# Patient Record
Sex: FEMALE | Race: WHITE | NOT HISPANIC OR LATINO | URBAN - METROPOLITAN AREA
[De-identification: names, ages, dates, MRNs, and addresses within clinical notes are randomized per-mention and may not be internally consistent; named-entity substitution may affect disease eponyms.]

---

## 2017-02-17 ENCOUNTER — OUTPATIENT (OUTPATIENT)
Dept: OUTPATIENT SERVICES | Facility: HOSPITAL | Age: 67
LOS: 1 days | Discharge: HOME | End: 2017-02-17

## 2017-02-17 ENCOUNTER — APPOINTMENT (OUTPATIENT)
Dept: HEMATOLOGY ONCOLOGY | Facility: CLINIC | Age: 67
End: 2017-02-17

## 2017-02-17 VITALS
BODY MASS INDEX: 35.33 KG/M2 | HEIGHT: 62 IN | SYSTOLIC BLOOD PRESSURE: 134 MMHG | WEIGHT: 192 LBS | RESPIRATION RATE: 14 BRPM | DIASTOLIC BLOOD PRESSURE: 80 MMHG | TEMPERATURE: 96.9 F | HEART RATE: 65 BPM

## 2017-02-17 DIAGNOSIS — Z86.79 PERSONAL HISTORY OF OTHER DISEASES OF THE CIRCULATORY SYSTEM: ICD-10-CM

## 2017-02-17 DIAGNOSIS — Z87.09 PERSONAL HISTORY OF OTHER DISEASES OF THE RESPIRATORY SYSTEM: ICD-10-CM

## 2017-02-17 DIAGNOSIS — E78.00 PURE HYPERCHOLESTEROLEMIA, UNSPECIFIED: ICD-10-CM

## 2017-02-17 DIAGNOSIS — Z78.9 OTHER SPECIFIED HEALTH STATUS: ICD-10-CM

## 2017-02-17 LAB
BASOPHILS # BLD: 0.04 TH/MM3
BASOPHILS NFR BLD: 0.5 %
EOSINOPHIL # BLD: 0.27 TH/MM3
EOSINOPHIL NFR BLD: 3.7 %
ERYTHROCYTE [DISTWIDTH] IN BLOOD BY AUTOMATED COUNT: 13.6 %
GRANULOCYTES # BLD: 3.77 TH/MM3
GRANULOCYTES NFR BLD: 51.4 %
HCT VFR BLD AUTO: 41.5 %
HGB BLD-MCNC: 13.6 G/DL
IMM GRANULOCYTES # BLD: 0.01 TH/MM3
IMM GRANULOCYTES NFR BLD: 0.1 %
LYMPHOCYTES # BLD: 2.48 TH/MM3
LYMPHOCYTES NFR BLD: 33.7 %
MCH RBC QN AUTO: 31.6 PG
MCHC RBC AUTO-ENTMCNC: 32.8 G/DL
MCV RBC AUTO: 96.5 FL
MONOCYTES # BLD: 0.78 TH/MM3
MONOCYTES NFR BLD: 10.6 %
PLATELET # BLD: 269 TH/MM3
PMV BLD AUTO: 9.9 FL
RBC # BLD AUTO: 4.3 MIL/MM3
WBC # BLD: 7.35 TH/MM3

## 2017-02-21 LAB
ALBUMIN SERPL-MCNC: 3.8 G/DL
ALBUMIN/GLOB SERPL: 1.36
ALP SERPL-CCNC: 128 IU/L
ALT SERPL-CCNC: 28 IU/L
ANION GAP SERPL CALC-SCNC: 12 MEQ/L
AST SERPL-CCNC: 44 IU/L
BILIRUB SERPL-MCNC: 0.5 MG/DL
BUN SERPL-MCNC: 17 MG/DL
BUN/CREAT SERPL: 27 %
CALCIUM SERPL-MCNC: 9.6 MG/DL
CHLORIDE SERPL-SCNC: 99 MEQ/L
CO2 SERPL-SCNC: 27 MEQ/L
CREAT SERPL-MCNC: 0.63 MG/DL
GFR SERPL CREATININE-BSD FRML MDRD: 95
GLUCOSE SERPL-MCNC: 73 MG/DL
POTASSIUM SERPL-SCNC: 4 MMOL/L
PROT SERPL-MCNC: 6.6 G/DL
SODIUM SERPL-SCNC: 138 MEQ/L

## 2017-05-16 ENCOUNTER — RX RENEWAL (OUTPATIENT)
Age: 67
End: 2017-05-16

## 2017-06-21 ENCOUNTER — OTHER (OUTPATIENT)
Age: 67
End: 2017-06-21

## 2017-06-23 ENCOUNTER — OUTPATIENT (OUTPATIENT)
Dept: OUTPATIENT SERVICES | Facility: HOSPITAL | Age: 67
LOS: 1 days | Discharge: HOME | End: 2017-06-23

## 2017-06-27 DIAGNOSIS — C50.919 MALIGNANT NEOPLASM OF UNSPECIFIED SITE OF UNSPECIFIED FEMALE BREAST: ICD-10-CM

## 2017-06-28 DIAGNOSIS — Z85.3 PERSONAL HISTORY OF MALIGNANT NEOPLASM OF BREAST: ICD-10-CM

## 2017-08-11 ENCOUNTER — APPOINTMENT (OUTPATIENT)
Dept: HEMATOLOGY ONCOLOGY | Facility: CLINIC | Age: 67
End: 2017-08-11

## 2017-08-11 ENCOUNTER — OUTPATIENT (OUTPATIENT)
Dept: OUTPATIENT SERVICES | Facility: HOSPITAL | Age: 67
LOS: 1 days | Discharge: HOME | End: 2017-08-11

## 2017-08-11 VITALS
HEIGHT: 62 IN | TEMPERATURE: 97.7 F | HEART RATE: 57 BPM | DIASTOLIC BLOOD PRESSURE: 70 MMHG | BODY MASS INDEX: 35.15 KG/M2 | WEIGHT: 191 LBS | SYSTOLIC BLOOD PRESSURE: 144 MMHG

## 2017-08-12 LAB
ALBUMIN SERPL-MCNC: 3.9 G/DL
ALP SERPL-CCNC: 80 IU/L
ALT SERPL-CCNC: 20 IU/L
AST SERPL-CCNC: 34 IU/L
BILIRUB DIRECT SERPL-MCNC: < 0.1 MG/DL
BILIRUB SERPL-MCNC: 0.7 MG/DL
PROT SERPL-MCNC: 6.6 G/DL

## 2017-08-14 DIAGNOSIS — C50.919 MALIGNANT NEOPLASM OF UNSPECIFIED SITE OF UNSPECIFIED FEMALE BREAST: ICD-10-CM

## 2017-09-29 ENCOUNTER — RX RENEWAL (OUTPATIENT)
Age: 67
End: 2017-09-29

## 2017-12-04 ENCOUNTER — OUTPATIENT (OUTPATIENT)
Dept: OUTPATIENT SERVICES | Facility: HOSPITAL | Age: 67
LOS: 1 days | Discharge: HOME | End: 2017-12-04

## 2017-12-04 ENCOUNTER — RX RENEWAL (OUTPATIENT)
Age: 67
End: 2017-12-04

## 2017-12-04 DIAGNOSIS — R92.8 OTHER ABNORMAL AND INCONCLUSIVE FINDINGS ON DIAGNOSTIC IMAGING OF BREAST: ICD-10-CM

## 2017-12-04 DIAGNOSIS — Z12.31 ENCOUNTER FOR SCREENING MAMMOGRAM FOR MALIGNANT NEOPLASM OF BREAST: ICD-10-CM

## 2018-01-12 ENCOUNTER — OUTPATIENT (OUTPATIENT)
Dept: OUTPATIENT SERVICES | Facility: HOSPITAL | Age: 68
LOS: 1 days | Discharge: HOME | End: 2018-01-12

## 2018-01-12 ENCOUNTER — APPOINTMENT (OUTPATIENT)
Dept: HEMATOLOGY ONCOLOGY | Facility: CLINIC | Age: 68
End: 2018-01-12

## 2018-01-12 VITALS
DIASTOLIC BLOOD PRESSURE: 67 MMHG | BODY MASS INDEX: 34.96 KG/M2 | RESPIRATION RATE: 14 BRPM | TEMPERATURE: 97.5 F | WEIGHT: 190 LBS | HEIGHT: 62 IN | HEART RATE: 67 BPM | SYSTOLIC BLOOD PRESSURE: 140 MMHG

## 2018-01-13 LAB
ALBUMIN SERPL-MCNC: 4.1 G/DL
ALBUMIN/GLOB SERPL: 1.64
ALP SERPL-CCNC: 82 IU/L
ALT SERPL-CCNC: 19 IU/L
ANION GAP SERPL CALC-SCNC: 11 MEQ/L
AST SERPL-CCNC: 34 IU/L
BASOPHILS # BLD: 0.03 TH/MM3
BASOPHILS NFR BLD: 0.5 %
BILIRUB SERPL-MCNC: 0.8 MG/DL
BUN SERPL-MCNC: 18 MG/DL
BUN/CREAT SERPL: 28.1 %
CALCIUM SERPL-MCNC: 10 MG/DL
CHLORIDE SERPL-SCNC: 100 MEQ/L
CHOLEST SERPL-MCNC: 300 MG/DL
CO2 SERPL-SCNC: 29 MEQ/L
CREAT SERPL-MCNC: 0.64 MG/DL
EOSINOPHIL # BLD: 0.21 TH/MM3
EOSINOPHIL NFR BLD: 3.5 %
ERYTHROCYTE [DISTWIDTH] IN BLOOD BY AUTOMATED COUNT: 13.2 %
ESTIMATED AVERGAGE GLUCOSE (NORTH): 117 MG/DL
GFR SERPL CREATININE-BSD FRML MDRD: 93
GLUCOSE SERPL-MCNC: 67 MG/DL
GRANULOCYTES # BLD: 2.93 TH/MM3
GRANULOCYTES NFR BLD: 49.3 %
HBA1C MFR BLD: 5.7 %
HCT VFR BLD AUTO: 44.4 %
HDLC SERPL-MCNC: 43 MG/DL
HDLC SERPL: 6.98
HGB BLD-MCNC: 14.3 G/DL
IMM GRANULOCYTES # BLD: 0.01 TH/MM3
IMM GRANULOCYTES NFR BLD: 0.2 %
LDLC SERPL DIRECT ASSAY-MCNC: 199 MG/DL
LYMPHOCYTES # BLD: 2.15 TH/MM3
LYMPHOCYTES NFR BLD: 36.1 %
MCH RBC QN AUTO: 31.7 PG
MCHC RBC AUTO-ENTMCNC: 32.2 G/DL
MCV RBC AUTO: 98.4 FL
MONOCYTES # BLD: 0.62 TH/MM3
MONOCYTES NFR BLD: 10.4 %
PLATELET # BLD: 215 TH/MM3
PMV BLD AUTO: 10.2 FL
POTASSIUM SERPL-SCNC: 3.9 MMOL/L
PROT SERPL-MCNC: 6.6 G/DL
RBC # BLD AUTO: 4.51 MIL/MM3
SODIUM SERPL-SCNC: 140 MEQ/L
T4 FREE SERPL-MCNC: 1 NG/DL
TRIGL SERPL-MCNC: 244 MG/DL
TSH SERPL DL<=0.005 MIU/L-ACNC: 2.3 UIU/ML
VLDLC SERPL-MCNC: 48 MG/DL
WBC # BLD: 5.95 TH/MM3

## 2018-01-15 DIAGNOSIS — C50.412 MALIGNANT NEOPLASM OF UPPER-OUTER QUADRANT OF LEFT FEMALE BREAST: ICD-10-CM

## 2018-01-29 ENCOUNTER — TRANSCRIPTION ENCOUNTER (OUTPATIENT)
Age: 68
End: 2018-01-29

## 2018-04-17 ENCOUNTER — RX RENEWAL (OUTPATIENT)
Age: 68
End: 2018-04-17

## 2018-06-28 ENCOUNTER — FORM ENCOUNTER (OUTPATIENT)
Age: 68
End: 2018-06-28

## 2018-06-29 ENCOUNTER — OUTPATIENT (OUTPATIENT)
Dept: OUTPATIENT SERVICES | Facility: HOSPITAL | Age: 68
LOS: 1 days | Discharge: HOME | End: 2018-06-29

## 2018-06-29 ENCOUNTER — APPOINTMENT (OUTPATIENT)
Dept: HEMATOLOGY ONCOLOGY | Facility: CLINIC | Age: 68
End: 2018-06-29

## 2018-06-29 VITALS
SYSTOLIC BLOOD PRESSURE: 137 MMHG | HEIGHT: 62 IN | HEART RATE: 53 BPM | TEMPERATURE: 97 F | WEIGHT: 188 LBS | RESPIRATION RATE: 14 BRPM | BODY MASS INDEX: 34.6 KG/M2 | DIASTOLIC BLOOD PRESSURE: 69 MMHG

## 2018-06-29 DIAGNOSIS — Z12.31 ENCOUNTER FOR SCREENING MAMMOGRAM FOR MALIGNANT NEOPLASM OF BREAST: ICD-10-CM

## 2018-07-02 DIAGNOSIS — C50.412 MALIGNANT NEOPLASM OF UPPER-OUTER QUADRANT OF LEFT FEMALE BREAST: ICD-10-CM

## 2018-07-03 DIAGNOSIS — Z02.9 ENCOUNTER FOR ADMINISTRATIVE EXAMINATIONS, UNSPECIFIED: ICD-10-CM

## 2018-07-03 DIAGNOSIS — Z12.31 ENCOUNTER FOR SCREENING MAMMOGRAM FOR MALIGNANT NEOPLASM OF BREAST: ICD-10-CM

## 2018-12-28 ENCOUNTER — APPOINTMENT (OUTPATIENT)
Dept: HEMATOLOGY ONCOLOGY | Facility: CLINIC | Age: 68
End: 2018-12-28

## 2018-12-28 ENCOUNTER — OUTPATIENT (OUTPATIENT)
Dept: OUTPATIENT SERVICES | Facility: HOSPITAL | Age: 68
LOS: 1 days | Discharge: HOME | End: 2018-12-28

## 2018-12-28 VITALS
WEIGHT: 189 LBS | RESPIRATION RATE: 16 BRPM | SYSTOLIC BLOOD PRESSURE: 135 MMHG | TEMPERATURE: 97.6 F | HEART RATE: 67 BPM | DIASTOLIC BLOOD PRESSURE: 63 MMHG | HEIGHT: 62 IN | BODY MASS INDEX: 34.78 KG/M2

## 2019-01-02 NOTE — PHYSICAL EXAM
[Fully active, able to carry on all pre-disease performance without restriction] : Status 0 - Fully active, able to carry on all pre-disease performance without restriction [Normal] : normal appearance, no rash, nodules, vesicles, ulcers, erythema [de-identified] : Status post left lumpectomy. There is no palpable abnormality in either breast and axilla.

## 2019-01-02 NOTE — ASSESSMENT
[FreeTextEntry1] : Stage I (HR positive, HER2 negative) left breast cancer s/p lumpectomy and radiation and currently on Aromasin, JUNE. \par \par Plan:\par 1. Continue Aromasin, calcium and vitamin D supplement. DEXA was done in 11/2017. \par 2. B/l  mammo in 6/2018 is normal. Next mammo in 06/2019.\par 3. Advised regular exercise and healthy diet.\par 4. Blood work will be done by her PMD. \par 5. Will followup in 6 months. \par \par Pt seen and examined with , who agreed with plan of care.

## 2019-01-02 NOTE — HISTORY OF PRESENT ILLNESS
[Disease: _____________________] : Disease: [unfilled] [de-identified] : This is a 67yearold female presenting for followup for history of stage I hormone receptor positive and HER2 negative left breast cancer.  She is status post left lumpectomy, sentinel lymph node biopsy, and IORT in 2015.  She was initially started on adjuvant Arimidex in 12/2015.  In 03/2016, she was switched to Aromasin due to multiple side effects on Arimidex.  She is tolerating the Aromasin much better. She complains of anxiety for which she is on Zoloft. \par Colonoscopy: 4 years ago\par Pap smear: done\par \par Bilateral dx mammo in 12/4/17, which did not reveal abnormal finding.\par Mammo: 6/2017 normal. [de-identified] : Dexa scan on 11/17/17 and it showed osteopenia in the spine and femoral neck regions.\par \par Denies any other complaints. \par \par 6/29/18:\par C/o intermittent anxiety and joint aches from Exemestane. Denies any other complaints. Had mammogram today morning and it was normal. \par \par 12/ 28/ 2019 \par Patient is here for a follow up visit. Apart from occasional joint pains, no other complaints.

## 2019-01-03 DIAGNOSIS — C50.412 MALIGNANT NEOPLASM OF UPPER-OUTER QUADRANT OF LEFT FEMALE BREAST: ICD-10-CM

## 2019-01-03 DIAGNOSIS — Z79.811 LONG TERM (CURRENT) USE OF AROMATASE INHIBITORS: ICD-10-CM

## 2019-06-30 ENCOUNTER — FORM ENCOUNTER (OUTPATIENT)
Age: 69
End: 2019-06-30

## 2019-07-01 ENCOUNTER — OUTPATIENT (OUTPATIENT)
Dept: OUTPATIENT SERVICES | Facility: HOSPITAL | Age: 69
LOS: 1 days | Discharge: HOME | End: 2019-07-01

## 2019-07-01 ENCOUNTER — APPOINTMENT (OUTPATIENT)
Dept: HEMATOLOGY ONCOLOGY | Facility: CLINIC | Age: 69
End: 2019-07-01
Payer: MEDICARE

## 2019-07-01 ENCOUNTER — OUTPATIENT (OUTPATIENT)
Dept: OUTPATIENT SERVICES | Facility: HOSPITAL | Age: 69
LOS: 1 days | Discharge: HOME | End: 2019-07-01
Payer: COMMERCIAL

## 2019-07-01 VITALS
WEIGHT: 186 LBS | TEMPERATURE: 97.2 F | HEIGHT: 62 IN | HEART RATE: 57 BPM | BODY MASS INDEX: 34.23 KG/M2 | DIASTOLIC BLOOD PRESSURE: 79 MMHG | SYSTOLIC BLOOD PRESSURE: 136 MMHG

## 2019-07-01 DIAGNOSIS — Z12.31 ENCOUNTER FOR SCREENING MAMMOGRAM FOR MALIGNANT NEOPLASM OF BREAST: ICD-10-CM

## 2019-07-01 PROCEDURE — 99214 OFFICE O/P EST MOD 30 MIN: CPT

## 2019-07-01 PROCEDURE — 77063 BREAST TOMOSYNTHESIS BI: CPT | Mod: 26

## 2019-07-01 PROCEDURE — 77067 SCR MAMMO BI INCL CAD: CPT | Mod: 26

## 2019-07-01 NOTE — ASSESSMENT
[FreeTextEntry1] : Stage I (HR positive, HER2 negative) left breast cancer s/p lumpectomy and radiation and currently on Aromasin, JUNE. \par \par Plan:\par 1. Continue Aromasin, calcium and vitamin D supplement.  \par 2. Will followup mammo report.\par 3. Advised regular exercise and healthy diet.\par 4. Blood work will be done by her PMD. \par 5. Will followup in 6 months. \par \par

## 2019-07-01 NOTE — HISTORY OF PRESENT ILLNESS
[Disease: _____________________] : Disease: [unfilled] [de-identified] : This is a 67yearold female presenting for followup for history of stage I hormone receptor positive and HER2 negative left breast cancer.  She is status post left lumpectomy, sentinel lymph node biopsy, and IORT in 2015.  She was initially started on adjuvant Arimidex in 12/2015.  In 03/2016, she was switched to Aromasin due to multiple side effects on Arimidex.  She is tolerating the Aromasin much better. She complains of anxiety for which she is on Zoloft. \par Colonoscopy: 4 years ago\par Pap smear: done\par \par Bilateral dx mammo in 12/4/17, which did not reveal abnormal finding.\par Mammo: 6/2017 normal. [de-identified] : Dexa scan on 11/17/17 and it showed osteopenia in the spine and femoral neck regions.\par \par Denies any other complaints. \par \par 6/29/18:\par The patient is her today for f/u visit. She has been taking exemestane daily. She C/o intermittent anxiety and joint aches from Exemestane. She started on Zoloft and feeling better. Denies any other complaints. Had mammogram today morning and it was normal. \par \par 7/1/19:\par The patient is here for followup visit. She has been taking Exemestane daily and tolerated well. She is taking Zoloft for anxiety and is feeling better. She had b/l screening mammo this morning. the report is still pending. She does not have any new complains. She saw her PCP and had blood work recently.

## 2019-07-01 NOTE — PHYSICAL EXAM
[Fully active, able to carry on all pre-disease performance without restriction] : Status 0 - Fully active, able to carry on all pre-disease performance without restriction [Normal] : normal appearance, no rash, nodules, vesicles, ulcers, erythema [de-identified] : Status post left lumpectomy. There is no palpable abnormality in either breast and axilla.

## 2019-07-01 NOTE — CONSULT LETTER
[Dear  ___] : Dear  [unfilled], [Courtesy Letter:] : I had the pleasure of seeing your patient, [unfilled], in my office today. [Please see my note below.] : Please see my note below. [Sincerely,] : Sincerely, [FreeTextEntry3] : Maddie Ramsey MD

## 2019-07-03 ENCOUNTER — OTHER (OUTPATIENT)
Age: 69
End: 2019-07-03

## 2019-07-05 ENCOUNTER — OUTPATIENT (OUTPATIENT)
Dept: OUTPATIENT SERVICES | Facility: HOSPITAL | Age: 69
LOS: 1 days | Discharge: HOME | End: 2019-07-05
Payer: MEDICARE

## 2019-07-05 DIAGNOSIS — R92.8 OTHER ABNORMAL AND INCONCLUSIVE FINDINGS ON DIAGNOSTIC IMAGING OF BREAST: ICD-10-CM

## 2019-07-05 DIAGNOSIS — C50.919 MALIGNANT NEOPLASM OF UNSPECIFIED SITE OF UNSPECIFIED FEMALE BREAST: ICD-10-CM

## 2019-07-05 DIAGNOSIS — Z79.811 LONG TERM (CURRENT) USE OF AROMATASE INHIBITORS: ICD-10-CM

## 2019-07-05 PROCEDURE — G0279: CPT | Mod: 26,RT

## 2019-07-05 PROCEDURE — 76642 ULTRASOUND BREAST LIMITED: CPT | Mod: 26,RT

## 2019-07-05 PROCEDURE — 77065 DX MAMMO INCL CAD UNI: CPT | Mod: 26,RT

## 2019-07-09 DIAGNOSIS — Z12.31 ENCOUNTER FOR SCREENING MAMMOGRAM FOR MALIGNANT NEOPLASM OF BREAST: ICD-10-CM

## 2019-07-09 DIAGNOSIS — Z02.9 ENCOUNTER FOR ADMINISTRATIVE EXAMINATIONS, UNSPECIFIED: ICD-10-CM

## 2019-07-26 ENCOUNTER — RX RENEWAL (OUTPATIENT)
Age: 69
End: 2019-07-26

## 2020-01-06 ENCOUNTER — OUTPATIENT (OUTPATIENT)
Dept: OUTPATIENT SERVICES | Facility: HOSPITAL | Age: 70
LOS: 1 days | Discharge: HOME | End: 2020-01-06

## 2020-01-06 ENCOUNTER — APPOINTMENT (OUTPATIENT)
Dept: HEMATOLOGY ONCOLOGY | Facility: CLINIC | Age: 70
End: 2020-01-06
Payer: MEDICARE

## 2020-01-06 VITALS
BODY MASS INDEX: 34.96 KG/M2 | HEIGHT: 62 IN | TEMPERATURE: 96.9 F | DIASTOLIC BLOOD PRESSURE: 75 MMHG | SYSTOLIC BLOOD PRESSURE: 184 MMHG | WEIGHT: 190 LBS | HEART RATE: 69 BPM

## 2020-01-06 PROCEDURE — 99214 OFFICE O/P EST MOD 30 MIN: CPT

## 2020-01-12 NOTE — HISTORY OF PRESENT ILLNESS
[Disease: _____________________] : Disease: [unfilled] [de-identified] : This is a 67yearold female presenting for followup for history of stage I hormone receptor positive and HER2 negative left breast cancer.  She is status post left lumpectomy, sentinel lymph node biopsy, and IORT in 2015.  She was initially started on adjuvant Arimidex in 12/2015.  In 03/2016, she was switched to Aromasin due to multiple side effects on Arimidex.  She is tolerating the Aromasin much better. She complains of anxiety for which she is on Zoloft. \par Colonoscopy: 4 years ago\par Pap smear: done\par \par Bilateral dx mammo in 12/4/17, which did not reveal abnormal finding.\par Mammo: 6/2017 normal. [de-identified] : Dexa scan on 11/17/17 and it showed osteopenia in the spine and femoral neck regions.\par Denies any other complaints. \par \par 6/29/18\par The patient is her today for f/u visit. She has been taking exemestane daily. She C/o intermittent anxiety and joint aches from Exemestane. She started on Zoloft and feeling better. Denies any other complaints. Had mammogram today morning and it was normal. \par \par 7/1/19\par The patient is here for followup visit. She has been taking Exemestane daily and tolerated well. She is taking Zoloft for anxiety and is feeling better. She had b/l screening mammo this morning. the report is still pending. She does not have any new complains. She saw her PCP and had blood work recently.\par \par 1/6/2020\par Patient is here today for follow up visit.  She was on Arimidex 12/15-3/16 then switched to Exemestane due to multiple side effects. She is tolerating Exemestane much better. She also takes Zoloft daily for anxiety with improvement. She had her bilateral screening mammogram on 7/2/19 and was recalled for additional views.  Bilateral diagnostic mammogram and right breast ultrasound done 7/5/19 which showed no suspicious findings. Last bone density was done 11/2017 - osteopenia.  She is taking only Vitamin D because she had an elevated calcium in the past.  She follows up with PCP , Dr. Mcpherson, regularly.

## 2020-01-12 NOTE — PHYSICAL EXAM
[Fully active, able to carry on all pre-disease performance without restriction] : Status 0 - Fully active, able to carry on all pre-disease performance without restriction [Normal] : normal appearance, no rash, nodules, vesicles, ulcers, erythema [de-identified] : Status post left lumpectomy. There is no palpable abnormality in either breast and axilla.

## 2020-01-12 NOTE — ASSESSMENT
[FreeTextEntry1] : Stage I (HR positive, HER2 negative) left breast cancer s/p lumpectomy and radiation and currently on Aromasin, JUNE. \par \par Plan:\par -- Continue Exemestane, calcium and vitamin D supplement.  Exemestane was e-refilled.\par -- Bilateral screening mammogram due 7/2020.  Prescription given.\par -- Follow up Bone density due.  Prescription given.\par -- Advised regular exercise and healthy diet.\par -- Blood work will be done by her PCP with follow up visits.\par -- Will followup in 6 months. \par \par Case was seen and discussed with Dr. Ramsey who agreed with the assessment and plan.\par \par

## 2020-01-14 DIAGNOSIS — Z79.811 LONG TERM (CURRENT) USE OF AROMATASE INHIBITORS: ICD-10-CM

## 2020-01-14 DIAGNOSIS — C50.912 MALIGNANT NEOPLASM OF UNSPECIFIED SITE OF LEFT FEMALE BREAST: ICD-10-CM

## 2020-07-06 ENCOUNTER — RESULT REVIEW (OUTPATIENT)
Age: 70
End: 2020-07-06

## 2020-07-06 ENCOUNTER — APPOINTMENT (OUTPATIENT)
Dept: HEMATOLOGY ONCOLOGY | Facility: CLINIC | Age: 70
End: 2020-07-06
Payer: MEDICARE

## 2020-07-06 ENCOUNTER — OUTPATIENT (OUTPATIENT)
Dept: OUTPATIENT SERVICES | Facility: HOSPITAL | Age: 70
LOS: 1 days | Discharge: HOME | End: 2020-07-06

## 2020-07-06 ENCOUNTER — OUTPATIENT (OUTPATIENT)
Dept: OUTPATIENT SERVICES | Facility: HOSPITAL | Age: 70
LOS: 1 days | Discharge: HOME | End: 2020-07-06
Payer: MEDICARE

## 2020-07-06 VITALS
DIASTOLIC BLOOD PRESSURE: 63 MMHG | WEIGHT: 192 LBS | HEART RATE: 70 BPM | TEMPERATURE: 97.7 F | SYSTOLIC BLOOD PRESSURE: 131 MMHG | BODY MASS INDEX: 35.33 KG/M2 | HEIGHT: 62 IN

## 2020-07-06 DIAGNOSIS — Z12.31 ENCOUNTER FOR SCREENING MAMMOGRAM FOR MALIGNANT NEOPLASM OF BREAST: ICD-10-CM

## 2020-07-06 PROCEDURE — 77067 SCR MAMMO BI INCL CAD: CPT | Mod: 26

## 2020-07-06 PROCEDURE — 99214 OFFICE O/P EST MOD 30 MIN: CPT

## 2020-07-06 PROCEDURE — 77063 BREAST TOMOSYNTHESIS BI: CPT | Mod: 26

## 2020-07-06 NOTE — HISTORY OF PRESENT ILLNESS
[Disease: _____________________] : Disease: [unfilled] [de-identified] : This is a 67yearold female presenting for followup for history of stage I hormone receptor positive and HER2 negative left breast cancer.  She is status post left lumpectomy, sentinel lymph node biopsy, and IORT in 2015.  She was initially started on adjuvant Arimidex in 12/2015.  In 03/2016, she was switched to Aromasin due to multiple side effects on Arimidex.  She is tolerating the Aromasin much better. She complains of anxiety for which she is on Zoloft. \par Colonoscopy: 4 years ago\par Pap smear: done\par \par Bilateral dx mammo in 12/4/17, which did not reveal abnormal finding.\par Mammo: 6/2017 normal. [de-identified] : Dexa scan on 11/17/17 and it showed osteopenia in the spine and femoral neck regions.\par Denies any other complaints. \par \par 6/29/18\par The patient is her today for f/u visit. She has been taking exemestane daily. She C/o intermittent anxiety and joint aches from Exemestane. She started on Zoloft and feeling better. Denies any other complaints. Had mammogram today morning and it was normal. \par \par 7/1/19\par The patient is here for followup visit. She has been taking Exemestane daily and tolerated well. She is taking Zoloft for anxiety and is feeling better. She had b/l screening mammo this morning. the report is still pending. She does not have any new complains. She saw her PCP and had blood work recently.\par \par 1/6/2020\par Patient is here today for follow up visit.  She was on Arimidex 12/15-3/16 then switched to Exemestane due to multiple side effects. She is tolerating Exemestane much better. She also takes Zoloft daily for anxiety with improvement. She had her bilateral screening mammogram on 7/2/19 and was recalled for additional views.  Bilateral diagnostic mammogram and right breast ultrasound done 7/5/19 which showed no suspicious findings. Last bone density was done 11/2017 - osteopenia.  She is taking only Vitamin D because she had an elevated calcium in the past.  She follows up with PCP , Dr. Mcpherson, regularly.\par \par 7/6/2020:\par Patient is here today for follow up visit.  She was diagnosed with Stage IA ([id number]), ER/KS pos and HER2 neg IDC of left breast cancer, G1, s/p lumpectomy, SLNB, IORT in 10/2015. She has been adjuvant endocrine therapy since 10/2015, initially with Anastrozole from 12/2015-3/2016 and switched Exemestane due to multiple side effects. She is tolerating Exemestane much better. She takes Zoloft daily for anxiety with improvement. She had her bilateral screening mammogram today. The report is not available yet.  Last bone density was done 11/2017 - osteopenia.  She is taking only Vitamin D because she had an elevated calcium in the past.

## 2020-07-06 NOTE — PHYSICAL EXAM
[Fully active, able to carry on all pre-disease performance without restriction] : Status 0 - Fully active, able to carry on all pre-disease performance without restriction [Normal] : normal appearance, no rash, nodules, vesicles, ulcers, erythema [de-identified] : Status post left lumpectomy. There is no palpable abnormality in either breast and axilla.

## 2020-07-06 NOTE — ASSESSMENT
[FreeTextEntry1] : Stage IA ([id number]) ER/MA positive, HER2 negative IDC of the left breast, G1, s/p lumpectomy and IORT and currently on Aromasin, JUNE. \par \par Plan:\par -- Continue Exemestane daily, vitamin D supplement. She will be on adjuvant endocrine therapy for 5 years in 12/2020. She is not indicated for extended endocrine therapy given she had low risk early stage breast cancer (5.5 mm tumor, G1, 3 SLNs negative). \par -- Continue Bilateral screening mammogram.\par -- Reminded to have repeat  bone density.  Prescription was given.\par -- Advised regular exercise and healthy diet.\par -- Blood work will be done by her PCP with follow up visits.\par -- Will followup in 6 months. \par \par Case was seen and discussed with Dr. Ramsey who agreed with the assessment and plan.\par \par

## 2020-08-19 DIAGNOSIS — C50.912 MALIGNANT NEOPLASM OF UNSPECIFIED SITE OF LEFT FEMALE BREAST: ICD-10-CM

## 2020-08-19 DIAGNOSIS — Z79.811 LONG TERM (CURRENT) USE OF AROMATASE INHIBITORS: ICD-10-CM

## 2021-01-08 ENCOUNTER — APPOINTMENT (OUTPATIENT)
Dept: HEMATOLOGY ONCOLOGY | Facility: CLINIC | Age: 71
End: 2021-01-08

## 2021-01-19 ENCOUNTER — APPOINTMENT (OUTPATIENT)
Dept: HEMATOLOGY ONCOLOGY | Facility: CLINIC | Age: 71
End: 2021-01-19
Payer: MEDICARE

## 2021-01-19 DIAGNOSIS — C50.912 MALIGNANT NEOPLASM OF UNSPECIFIED SITE OF LEFT FEMALE BREAST: ICD-10-CM

## 2021-01-19 DIAGNOSIS — Z79.811 LONG TERM (CURRENT) USE OF AROMATASE INHIBITORS: ICD-10-CM

## 2021-01-19 PROCEDURE — 99443: CPT | Mod: 95

## 2021-01-23 PROBLEM — C50.912 BREAST CANCER, LEFT BREAST: Status: ACTIVE | Noted: 2020-01-12

## 2021-01-23 PROBLEM — Z79.811 LONG TERM (CURRENT) USE OF AROMATASE INHIBITORS: Status: ACTIVE | Noted: 2019-07-01

## 2021-01-23 NOTE — CONSULT LETTER
[Dear  ___] : Dear  [unfilled], [Courtesy Letter:] : I had the pleasure of seeing your patient, [unfilled], in my office today. [Sincerely,] : Sincerely, [Please see my note below.] : Please see my note below. [FreeTextEntry3] : Maddie Ramsey MD

## 2021-01-23 NOTE — HISTORY OF PRESENT ILLNESS
[Home] : at home, [unfilled] , at the time of the visit. [Medical Office: ([facility])___] : at the medical office located in  [Disease: _____________________] : Disease: [unfilled] [de-identified] : This is a 67yearold female presenting for followup for history of stage I hormone receptor positive and HER2 negative left breast cancer.  She is status post left lumpectomy, sentinel lymph node biopsy, and IORT in 2015.  She was initially started on adjuvant Arimidex in 12/2015.  In 03/2016, she was switched to Aromasin due to multiple side effects on Arimidex.  She is tolerating the Aromasin much better. She complains of anxiety for which she is on Zoloft. \par Colonoscopy: 4 years ago\par Pap smear: done\par \par Bilateral dx mammo in 12/4/17, which did not reveal abnormal finding.\par Mammo: 6/2017 normal. [de-identified] : Dexa scan on 11/17/17 and it showed osteopenia in the spine and femoral neck regions.\par Denies any other complaints. \par \par 6/29/18\par The patient is her today for f/u visit. She has been taking exemestane daily. She C/o intermittent anxiety and joint aches from Exemestane. She started on Zoloft and feeling better. Denies any other complaints. Had mammogram today morning and it was normal. \par \par 7/1/19\par The patient is here for followup visit. She has been taking Exemestane daily and tolerated well. She is taking Zoloft for anxiety and is feeling better. She had b/l screening mammo this morning. the report is still pending. She does not have any new complains. She saw her PCP and had blood work recently.\par \par 1/6/2020\par Patient is here today for follow up visit.  She was on Arimidex 12/15-3/16 then switched to Exemestane due to multiple side effects. She is tolerating Exemestane much better. She also takes Zoloft daily for anxiety with improvement. She had her bilateral screening mammogram on 7/2/19 and was recalled for additional views.  Bilateral diagnostic mammogram and right breast ultrasound done 7/5/19 which showed no suspicious findings. Last bone density was done 11/2017 - osteopenia.  She is taking only Vitamin D because she had an elevated calcium in the past.  She follows up with PCP , Dr. Mcpherson, regularly.\par \par 7/6/2020:\par Patient is here today for follow up visit.  She was diagnosed with Stage IA ([id number]), ER/ND pos and HER2 neg IDC of left breast cancer, G1, s/p lumpectomy, SLNB, IORT in 10/2015. She has been adjuvant endocrine therapy since 10/2015, initially with Anastrozole from 12/2015-3/2016 and switched Exemestane due to multiple side effects. She is tolerating Exemestane much better. She takes Zoloft daily for anxiety with improvement. She had her bilateral screening mammogram today. The report is not available yet.  Last bone density was done 11/2017 - osteopenia.  She is taking only Vitamin D because she had an elevated calcium in the past.  \par \par 1/19/2021:\par The patient is evaluated vis telehealth with phone only.  She was diagnosed with Stage IA ([id number]), ER/ND pos and HER2 neg IDC of left breast cancer, G1, s/p lumpectomy, SLNB, IORT in 10/2015. She has been adjuvant endocrine therapy since 10/2015, initially with Anastrozole from 12/2015-3/2016 and switched Exemestane due to multiple side effects. She is tolerating Exemestane much better. She takes Zoloft daily for anxiety with improvement. She had her bilateral screening mammogram on 7/6/2020. There was no suspicious finding. Last bone density was done 11/2017 which showed osteopenia.  She is taking only Vitamin D because she had an elevated calcium in the past.  \par

## 2021-01-23 NOTE — ASSESSMENT
[FreeTextEntry1] : Stage IA ([id number]) ER/[place] positive, HER2 negative IDC of the left breast, G1, s/p lumpectomy and IORT and currently on Aromasin, JUNE. \par \par Plan:\par -- She has been on adjuvant endocrine therapy for 5 years by 12/2020. She is not indicated for extended endocrine therapy given she had low risk early stage breast cancer (5.5 mm tumor, G1, 3 SLNs negative). She may discontinue Exemestane. \par -- Continue Bilateral screening mammogram.\par -- Reminded to have repeat  bone density.  Prescription was given.\par -- Advised regular exercise and healthy diet, calcium and vitamin D supplement.\par -- Blood work will be done by her PCP with follow up visits.\par -- Will followup in one year. \par \par \par \par

## 2021-05-06 ENCOUNTER — OUTPATIENT (OUTPATIENT)
Dept: OUTPATIENT SERVICES | Facility: HOSPITAL | Age: 71
LOS: 1 days | Discharge: HOME | End: 2021-05-06
Payer: MEDICARE

## 2021-05-06 DIAGNOSIS — Z85.3 PERSONAL HISTORY OF MALIGNANT NEOPLASM OF BREAST: ICD-10-CM

## 2021-05-06 LAB — GLUCOSE BLDC GLUCOMTR-MCNC: 114 MG/DL — HIGH (ref 70–99)

## 2021-05-06 PROCEDURE — 78815 PET IMAGE W/CT SKULL-THIGH: CPT | Mod: 26,PI

## 2021-07-07 ENCOUNTER — RESULT REVIEW (OUTPATIENT)
Age: 71
End: 2021-07-07

## 2021-07-07 ENCOUNTER — OUTPATIENT (OUTPATIENT)
Dept: OUTPATIENT SERVICES | Facility: HOSPITAL | Age: 71
LOS: 1 days | Discharge: HOME | End: 2021-07-07
Payer: MEDICARE

## 2021-07-07 DIAGNOSIS — Z85.3 PERSONAL HISTORY OF MALIGNANT NEOPLASM OF BREAST: ICD-10-CM

## 2021-07-07 DIAGNOSIS — Z12.31 ENCOUNTER FOR SCREENING MAMMOGRAM FOR MALIGNANT NEOPLASM OF BREAST: ICD-10-CM

## 2021-07-07 PROCEDURE — 76641 ULTRASOUND BREAST COMPLETE: CPT | Mod: 26,50

## 2021-07-07 PROCEDURE — 77067 SCR MAMMO BI INCL CAD: CPT | Mod: 26

## 2021-07-07 PROCEDURE — 77063 BREAST TOMOSYNTHESIS BI: CPT | Mod: 26

## 2022-07-16 ENCOUNTER — OUTPATIENT (OUTPATIENT)
Dept: OUTPATIENT SERVICES | Facility: HOSPITAL | Age: 72
LOS: 1 days | Discharge: HOME | End: 2022-07-16

## 2022-07-16 DIAGNOSIS — Z12.31 ENCOUNTER FOR SCREENING MAMMOGRAM FOR MALIGNANT NEOPLASM OF BREAST: ICD-10-CM

## 2022-07-16 PROCEDURE — 77063 BREAST TOMOSYNTHESIS BI: CPT | Mod: 26

## 2022-07-16 PROCEDURE — 77067 SCR MAMMO BI INCL CAD: CPT | Mod: 26

## 2023-07-18 ENCOUNTER — OUTPATIENT (OUTPATIENT)
Dept: OUTPATIENT SERVICES | Facility: HOSPITAL | Age: 73
LOS: 1 days | End: 2023-07-18
Payer: MEDICARE

## 2023-07-18 DIAGNOSIS — Z12.31 ENCOUNTER FOR SCREENING MAMMOGRAM FOR MALIGNANT NEOPLASM OF BREAST: ICD-10-CM

## 2023-07-18 PROCEDURE — 77067 SCR MAMMO BI INCL CAD: CPT

## 2023-07-18 PROCEDURE — 77063 BREAST TOMOSYNTHESIS BI: CPT | Mod: 26

## 2023-07-18 PROCEDURE — 77063 BREAST TOMOSYNTHESIS BI: CPT

## 2023-07-18 PROCEDURE — 77067 SCR MAMMO BI INCL CAD: CPT | Mod: 26

## 2023-07-19 DIAGNOSIS — Z12.31 ENCOUNTER FOR SCREENING MAMMOGRAM FOR MALIGNANT NEOPLASM OF BREAST: ICD-10-CM

## 2024-01-02 ENCOUNTER — EMERGENCY (EMERGENCY)
Facility: HOSPITAL | Age: 74
LOS: 0 days | Discharge: LEFT BEFORE DISCHARGE | End: 2024-01-02
Payer: MEDICARE

## 2024-01-02 VITALS
SYSTOLIC BLOOD PRESSURE: 202 MMHG | RESPIRATION RATE: 14 BRPM | HEART RATE: 74 BPM | TEMPERATURE: 98 F | WEIGHT: 199.96 LBS | DIASTOLIC BLOOD PRESSURE: 85 MMHG | OXYGEN SATURATION: 98 %

## 2024-01-02 DIAGNOSIS — K08.89 OTHER SPECIFIED DISORDERS OF TEETH AND SUPPORTING STRUCTURES: ICD-10-CM

## 2024-01-02 DIAGNOSIS — Z53.21 PROCEDURE AND TREATMENT NOT CARRIED OUT DUE TO PATIENT LEAVING PRIOR TO BEING SEEN BY HEALTH CARE PROVIDER: ICD-10-CM

## 2024-01-02 PROCEDURE — L9992: CPT

## 2024-05-20 ENCOUNTER — APPOINTMENT (OUTPATIENT)
Dept: PULMONOLOGY | Facility: CLINIC | Age: 74
End: 2024-05-20

## 2024-05-31 ENCOUNTER — OUTPATIENT (OUTPATIENT)
Dept: OUTPATIENT SERVICES | Facility: HOSPITAL | Age: 74
LOS: 1 days | Discharge: HOME | End: 2024-05-31
Payer: MEDICARE

## 2024-05-31 VITALS
SYSTOLIC BLOOD PRESSURE: 171 MMHG | HEIGHT: 61 IN | OXYGEN SATURATION: 96 % | WEIGHT: 182.1 LBS | HEART RATE: 79 BPM | RESPIRATION RATE: 17 BRPM | DIASTOLIC BLOOD PRESSURE: 102 MMHG | TEMPERATURE: 98 F

## 2024-05-31 VITALS — DIASTOLIC BLOOD PRESSURE: 89 MMHG | HEART RATE: 74 BPM | RESPIRATION RATE: 17 BRPM | SYSTOLIC BLOOD PRESSURE: 154 MMHG

## 2024-05-31 DIAGNOSIS — H25.11 AGE-RELATED NUCLEAR CATARACT, RIGHT EYE: ICD-10-CM

## 2024-05-31 DIAGNOSIS — Z98.890 OTHER SPECIFIED POSTPROCEDURAL STATES: Chronic | ICD-10-CM

## 2024-05-31 PROCEDURE — V2632: CPT

## 2024-05-31 RX ORDER — SERTRALINE 25 MG/1
1 TABLET, FILM COATED ORAL
Refills: 0 | DISCHARGE

## 2024-05-31 RX ORDER — METOPROLOL TARTRATE 50 MG
1 TABLET ORAL
Refills: 0 | DISCHARGE

## 2024-05-31 NOTE — ASU PATIENT PROFILE, ADULT - NSICDXPASTMEDICALHX_GEN_ALL_CORE_FT
+sleep study 09/30/10, was prescibed CPAP.    PAST MEDICAL HISTORY:  Acute asthma exacerbation     Anxiety     Breast cancer     HLD (hyperlipidemia)     HTN (hypertension)

## 2024-05-31 NOTE — ASU PATIENT PROFILE, ADULT - FALL HARM RISK - UNIVERSAL INTERVENTIONS
Bed in lowest position, wheels locked, appropriate side rails in place/Call bell, personal items and telephone in reach/Instruct patient to call for assistance before getting out of bed or chair/Non-slip footwear when patient is out of bed/[place] to call system/Physically safe environment - no spills, clutter or unnecessary equipment/Purposeful Proactive Rounding/Room/bathroom lighting operational, light cord in reach

## 2024-06-03 DIAGNOSIS — H26.8 OTHER SPECIFIED CATARACT: ICD-10-CM

## 2024-06-07 ENCOUNTER — OUTPATIENT (OUTPATIENT)
Dept: OUTPATIENT SERVICES | Facility: HOSPITAL | Age: 74
LOS: 1 days | Discharge: HOME | End: 2024-06-07
Payer: MEDICARE

## 2024-06-07 VITALS
HEIGHT: 61 IN | TEMPERATURE: 98 F | DIASTOLIC BLOOD PRESSURE: 93 MMHG | WEIGHT: 182.1 LBS | SYSTOLIC BLOOD PRESSURE: 181 MMHG | HEART RATE: 65 BPM | OXYGEN SATURATION: 93 % | RESPIRATION RATE: 17 BRPM

## 2024-06-07 VITALS — SYSTOLIC BLOOD PRESSURE: 150 MMHG | RESPIRATION RATE: 18 BRPM | DIASTOLIC BLOOD PRESSURE: 70 MMHG | HEART RATE: 67 BPM

## 2024-06-07 DIAGNOSIS — Z96.1 PRESENCE OF INTRAOCULAR LENS: Chronic | ICD-10-CM

## 2024-06-07 DIAGNOSIS — H25.12 AGE-RELATED NUCLEAR CATARACT, LEFT EYE: ICD-10-CM

## 2024-06-07 DIAGNOSIS — Z98.890 OTHER SPECIFIED POSTPROCEDURAL STATES: Chronic | ICD-10-CM

## 2024-06-07 PROCEDURE — V2632: CPT

## 2024-06-07 RX ORDER — EZETIMIBE 10 MG/1
1 TABLET ORAL
Refills: 0 | DISCHARGE

## 2024-06-07 RX ORDER — FLUTICASONE PROPIONATE AND SALMETEROL 50; 250 UG/1; UG/1
1 POWDER ORAL; RESPIRATORY (INHALATION)
Refills: 0 | DISCHARGE

## 2024-06-07 RX ORDER — SERTRALINE 25 MG/1
1 TABLET, FILM COATED ORAL
Refills: 0 | DISCHARGE

## 2024-06-07 RX ORDER — LOSARTAN/HYDROCHLOROTHIAZIDE 100MG-25MG
1 TABLET ORAL
Refills: 0 | DISCHARGE

## 2024-06-07 RX ORDER — ATORVASTATIN CALCIUM 80 MG/1
1 TABLET, FILM COATED ORAL
Refills: 0 | DISCHARGE

## 2024-06-07 RX ORDER — METOPROLOL TARTRATE 50 MG
1 TABLET ORAL
Refills: 0 | DISCHARGE

## 2024-06-07 NOTE — ASU PATIENT PROFILE, ADULT - NSICDXPASTMEDICALHX_GEN_ALL_CORE_FT
PAST MEDICAL HISTORY:  Acute asthma exacerbation     Anxiety     Breast cancer     HLD (hyperlipidemia)     HTN (hypertension)

## 2024-06-10 DIAGNOSIS — I10 ESSENTIAL (PRIMARY) HYPERTENSION: ICD-10-CM

## 2024-06-10 DIAGNOSIS — H26.20 UNSPECIFIED COMPLICATED CATARACT: ICD-10-CM

## 2024-06-10 DIAGNOSIS — Z85.3 PERSONAL HISTORY OF MALIGNANT NEOPLASM OF BREAST: ICD-10-CM

## 2024-06-10 DIAGNOSIS — Z96.1 PRESENCE OF INTRAOCULAR LENS: ICD-10-CM

## 2024-06-10 DIAGNOSIS — F41.9 ANXIETY DISORDER, UNSPECIFIED: ICD-10-CM

## 2024-06-10 DIAGNOSIS — J45.901 UNSPECIFIED ASTHMA WITH (ACUTE) EXACERBATION: ICD-10-CM

## 2024-06-10 DIAGNOSIS — E78.5 HYPERLIPIDEMIA, UNSPECIFIED: ICD-10-CM

## 2024-06-12 ENCOUNTER — APPOINTMENT (OUTPATIENT)
Dept: CARDIOLOGY | Facility: CLINIC | Age: 74
End: 2024-06-12

## 2024-06-27 PROBLEM — E78.5 HYPERLIPIDEMIA, UNSPECIFIED: Chronic | Status: ACTIVE | Noted: 2024-05-31

## 2024-06-27 PROBLEM — F41.9 ANXIETY DISORDER, UNSPECIFIED: Chronic | Status: ACTIVE | Noted: 2024-05-31

## 2024-06-27 PROBLEM — J45.901 UNSPECIFIED ASTHMA WITH (ACUTE) EXACERBATION: Chronic | Status: ACTIVE | Noted: 2024-05-31

## 2024-06-27 PROBLEM — C50.919 MALIGNANT NEOPLASM OF UNSPECIFIED SITE OF UNSPECIFIED FEMALE BREAST: Chronic | Status: ACTIVE | Noted: 2024-05-31

## 2024-06-27 PROBLEM — I10 ESSENTIAL (PRIMARY) HYPERTENSION: Chronic | Status: ACTIVE | Noted: 2024-05-31

## 2024-07-19 ENCOUNTER — OUTPATIENT (OUTPATIENT)
Dept: OUTPATIENT SERVICES | Facility: HOSPITAL | Age: 74
LOS: 1 days | End: 2024-07-19
Payer: MEDICARE

## 2024-07-19 DIAGNOSIS — Z96.1 PRESENCE OF INTRAOCULAR LENS: Chronic | ICD-10-CM

## 2024-07-19 DIAGNOSIS — Z12.31 ENCOUNTER FOR SCREENING MAMMOGRAM FOR MALIGNANT NEOPLASM OF BREAST: ICD-10-CM

## 2024-07-19 DIAGNOSIS — Z98.890 OTHER SPECIFIED POSTPROCEDURAL STATES: Chronic | ICD-10-CM

## 2024-07-19 PROCEDURE — 77067 SCR MAMMO BI INCL CAD: CPT | Mod: 26

## 2024-07-19 PROCEDURE — 77063 BREAST TOMOSYNTHESIS BI: CPT | Mod: 26

## 2024-07-19 PROCEDURE — 77063 BREAST TOMOSYNTHESIS BI: CPT

## 2024-07-19 PROCEDURE — 77067 SCR MAMMO BI INCL CAD: CPT

## 2024-07-20 DIAGNOSIS — Z12.31 ENCOUNTER FOR SCREENING MAMMOGRAM FOR MALIGNANT NEOPLASM OF BREAST: ICD-10-CM

## 2024-07-24 ENCOUNTER — APPOINTMENT (OUTPATIENT)
Dept: CARDIOLOGY | Facility: CLINIC | Age: 74
End: 2024-07-24

## 2024-07-24 VITALS — DIASTOLIC BLOOD PRESSURE: 88 MMHG | SYSTOLIC BLOOD PRESSURE: 170 MMHG

## 2024-07-24 VITALS
DIASTOLIC BLOOD PRESSURE: 90 MMHG | WEIGHT: 183 LBS | HEART RATE: 59 BPM | HEIGHT: 61 IN | SYSTOLIC BLOOD PRESSURE: 168 MMHG | BODY MASS INDEX: 34.55 KG/M2

## 2024-07-24 DIAGNOSIS — E78.00 PURE HYPERCHOLESTEROLEMIA, UNSPECIFIED: ICD-10-CM

## 2024-07-24 DIAGNOSIS — R06.02 SHORTNESS OF BREATH: ICD-10-CM

## 2024-07-24 DIAGNOSIS — C50.912 MALIGNANT NEOPLASM OF UNSPECIFIED SITE OF LEFT FEMALE BREAST: ICD-10-CM

## 2024-07-24 DIAGNOSIS — I65.29 OCCLUSION AND STENOSIS OF UNSPECIFIED CAROTID ARTERY: ICD-10-CM

## 2024-07-24 DIAGNOSIS — I10 ESSENTIAL (PRIMARY) HYPERTENSION: ICD-10-CM

## 2024-07-24 PROCEDURE — 93000 ELECTROCARDIOGRAM COMPLETE: CPT

## 2024-07-24 PROCEDURE — 99204 OFFICE O/P NEW MOD 45 MIN: CPT

## 2024-07-24 RX ORDER — ATORVASTATIN CALCIUM 20 MG/1
20 TABLET, FILM COATED ORAL
Refills: 0 | Status: ACTIVE | COMMUNITY

## 2024-07-24 RX ORDER — LEVALBUTEROL TARTRATE 45 UG/1
45 AEROSOL, METERED ORAL
Refills: 0 | Status: ACTIVE | COMMUNITY

## 2024-07-24 RX ORDER — EZETIMIBE 10 MG/1
10 TABLET ORAL
Refills: 0 | Status: ACTIVE | COMMUNITY

## 2024-07-24 RX ORDER — FLUTICASONE PROPIONATE AND SALMETEROL 100; 50 UG/1; UG/1
100-50 POWDER RESPIRATORY (INHALATION)
Refills: 0 | Status: ACTIVE | COMMUNITY

## 2024-07-24 RX ORDER — LOSARTAN POTASSIUM AND HYDROCHLOROTHIAZIDE 25; 100 MG/1; MG/1
100-25 TABLET ORAL
Refills: 0 | Status: ACTIVE | COMMUNITY

## 2024-07-24 RX ORDER — AMLODIPINE BESYLATE 5 MG/1
5 TABLET ORAL DAILY
Qty: 90 | Refills: 2 | Status: ACTIVE | COMMUNITY
Start: 2024-07-24 | End: 1900-01-01

## 2024-07-24 RX ORDER — METOPROLOL SUCCINATE 50 MG/1
50 TABLET, EXTENDED RELEASE ORAL
Refills: 0 | Status: DISCONTINUED | COMMUNITY
End: 2024-07-24

## 2024-07-24 NOTE — ASSESSMENT
[FreeTextEntry1] : 73 year old woman with HTN, HLD, asthma and a history of Breast Cancer who presents to establish care.   1. Shortness of breath on exertion: most likely from her asthma. She does not see a Pulmonologist. EKG without ischemic changes. She is euvolemic on exam.  - Check echocardiogram to rule out structural abnormalities  2. HTN: BP above goal today. Her goal is <130/80 mmHg.  - Continue Losartan/HCTZ combo pill - Unclear why she is on metoprolol; she has no arrhythmias, she has not had an MI. Metoprolol is not a first line anti-hypertensive. She is already bradycardic probably due to being on metoprolol for so long. Stop this medication as it is no indicated for this patient.  - Start amlodipine 5mg daily - Recommended to keep a BP log at home and call me if BP is consistently above goal - We discussed the importance of adhering to a low sodium diet of <2000 mg of sodium per day  3. HLD: Her LDL is at goal of <100. Check carotid duplex. Continue atorvastatin. Unclear why she is on Zetia without maximal dose statin.   The primary prevention of heart disease was discussed in detail with the patient, including adhering to a heart healthy, plant based, or Mediterranean diet, and the importance of 30 minutes of moderate intensity activity for 30 minutes, 5 times a week. All the patient's questions were answered.  RTC in 4 weeks.

## 2024-07-24 NOTE — HISTORY OF PRESENT ILLNESS
[FreeTextEntry1] : RENO FLORES is a 73 year old woman with HTN, HLD, asthma and a history of Breast Cancer who presents to establish care.   The patient denies chest pain, palpitations and syncope. No leg swelling, orthopnea and PND. She denies shortness of breath at rest but has shortness of breath with exertion that she attributes to her asthma. This is mostly when she is climbing hills and not on flat ground. She takes her inhaler and feels somewhat better.   She does not have an exercise regimen. She likes to travel.

## 2024-08-14 ENCOUNTER — APPOINTMENT (OUTPATIENT)
Dept: CARDIOLOGY | Facility: CLINIC | Age: 74
End: 2024-08-14
Payer: MEDICARE

## 2024-08-14 PROCEDURE — 93306 TTE W/DOPPLER COMPLETE: CPT

## 2024-08-14 PROCEDURE — 93880 EXTRACRANIAL BILAT STUDY: CPT

## 2024-08-21 ENCOUNTER — APPOINTMENT (OUTPATIENT)
Dept: CARDIOLOGY | Facility: CLINIC | Age: 74
End: 2024-08-21
Payer: MEDICARE

## 2024-08-21 VITALS
BODY MASS INDEX: 33.82 KG/M2 | SYSTOLIC BLOOD PRESSURE: 168 MMHG | HEART RATE: 81 BPM | WEIGHT: 179 LBS | DIASTOLIC BLOOD PRESSURE: 82 MMHG

## 2024-08-21 DIAGNOSIS — I10 ESSENTIAL (PRIMARY) HYPERTENSION: ICD-10-CM

## 2024-08-21 DIAGNOSIS — I65.29 OCCLUSION AND STENOSIS OF UNSPECIFIED CAROTID ARTERY: ICD-10-CM

## 2024-08-21 DIAGNOSIS — E78.00 PURE HYPERCHOLESTEROLEMIA, UNSPECIFIED: ICD-10-CM

## 2024-08-21 PROCEDURE — G2211 COMPLEX E/M VISIT ADD ON: CPT

## 2024-08-21 PROCEDURE — 99213 OFFICE O/P EST LOW 20 MIN: CPT

## 2024-08-21 NOTE — ASSESSMENT
[FreeTextEntry1] : 74 year old woman with HTN, HLD, asthma and a history of Breast Cancer who presents for follow up today.   1. HTN: BP above goal today. Her goal is <130/80 mmHg.  - Continue Losartan/HCTZ combo pill - Continue amlodipine 5mg daily - Recommended to keep a BP log at home and call me if BP is consistently above goal - We discussed the importance of adhering to a low sodium diet of <2000 mg of sodium per day  2. HLD: Her LDL is at goal of <100. Check carotid duplex. Continue atorvastatin. Unclear why she is on Zetia without maximal dose statin.   The primary prevention of heart disease was discussed in detail with the patient, including adhering to a heart healthy, plant based, or Mediterranean diet, and the importance of 30 minutes of moderate intensity activity for 30 minutes, 5 times a week. All the patient's questions were answered.  RTC in 4 weeks.

## 2024-08-21 NOTE — CARDIOLOGY SUMMARY
[de-identified] : 7/24/24: sinus bradycardia  [de-identified] : 8/14/24: 1. Technically difficult image quality. 2. Left ventricular cavity is normal in size. Left ventricular systolic function is normal with an ejection fraction visually estimated at 60 to 65 %. There is increased LV mass and concentric hypertrophy. 3. Normal right ventricular cavity size and normal right ventricular systolic function. 4. Left atrium is mildly dilated. 5. No significant valvular disease. 6. No prior echocardiogram is available for comparison. [de-identified] : Carotid duplex 8/14/24: 1. Right: proximal ICA <50% stenosis, vessel tortuous. 2. Left: proximal ICA no stenosis, vessel tortuous. 3. Vertebral arteries: antegrade flow bilaterally. 4. Subclavian arteries: no significant atherosclerosis bilaterally. 5. No prior exam is available for comparison.

## 2024-08-21 NOTE — HISTORY OF PRESENT ILLNESS
[FreeTextEntry1] : RENO FLORES is a 74 year old woman with HTN, HLD, asthma and a history of Breast Cancer who presents for follow up today.   The patient denies chest pain, palpitations and syncope. No leg swelling, orthopnea and PND. She denies shortness of breath at rest but has shortness of breath with exertion that she attributes to her asthma. This is mostly when she is climbing hills and not on flat ground. She takes her inhaler and feels somewhat better.   She does not have an exercise regimen. She likes to travel.   For her HTN, she is on Losartan/HCTZ combo pill. She had been on metoprolol for years that we stopped. She is on Amlodipine 5mg daily now and is tolerating it well. She hasn't been checking her BP at home. She joined [employer] and is planning to join [employer] as well.

## 2024-09-23 ENCOUNTER — APPOINTMENT (OUTPATIENT)
Dept: CARDIOLOGY | Facility: CLINIC | Age: 74
End: 2024-09-23

## 2025-02-03 ENCOUNTER — RX RENEWAL (OUTPATIENT)
Age: 75
End: 2025-02-03